# Patient Record
Sex: FEMALE | Race: WHITE | NOT HISPANIC OR LATINO | URBAN - METROPOLITAN AREA
[De-identification: names, ages, dates, MRNs, and addresses within clinical notes are randomized per-mention and may not be internally consistent; named-entity substitution may affect disease eponyms.]

---

## 2018-10-31 ENCOUNTER — TELEMEDICINE2 (OUTPATIENT)
Dept: URGENT CARE | Facility: CLINIC | Age: 33
End: 2018-10-31
Payer: MEDICAID

## 2018-10-31 ENCOUNTER — NON-PROVIDER VISIT (OUTPATIENT)
Dept: MEDICAL GROUP | Facility: CLINIC | Age: 33
End: 2018-10-31
Payer: MEDICAID

## 2018-10-31 ENCOUNTER — TELEMEDICINE ORIGINATING SITE VISIT (OUTPATIENT)
Dept: MEDICAL GROUP | Facility: CLINIC | Age: 33
End: 2018-10-31
Payer: MEDICAID

## 2018-10-31 VITALS
DIASTOLIC BLOOD PRESSURE: 79 MMHG | WEIGHT: 140 LBS | TEMPERATURE: 98.2 F | HEIGHT: 63 IN | RESPIRATION RATE: 16 BRPM | SYSTOLIC BLOOD PRESSURE: 123 MMHG | OXYGEN SATURATION: 98 % | BODY MASS INDEX: 24.8 KG/M2 | HEART RATE: 82 BPM

## 2018-10-31 DIAGNOSIS — N30.01 ACUTE CYSTITIS WITH HEMATURIA: ICD-10-CM

## 2018-10-31 DIAGNOSIS — N23 KIDNEY PAIN: ICD-10-CM

## 2018-10-31 DIAGNOSIS — R39.15 URINARY URGENCY: ICD-10-CM

## 2018-10-31 DIAGNOSIS — R35.0 URINARY FREQUENCY: ICD-10-CM

## 2018-10-31 LAB
APPEARANCE UR: CLEAR
BILIRUB UR STRIP-MCNC: NEGATIVE MG/DL
COLOR UR AUTO: YELLOW
GLUCOSE UR STRIP.AUTO-MCNC: NEGATIVE MG/DL
KETONES UR STRIP.AUTO-MCNC: NEGATIVE MG/DL
LEUKOCYTE ESTERASE UR QL STRIP.AUTO: NORMAL
NITRITE UR QL STRIP.AUTO: POSITIVE
PH UR STRIP.AUTO: 6.5 [PH] (ref 5–8)
PROT UR QL STRIP: NORMAL MG/DL
RBC UR QL AUTO: NORMAL
SP GR UR STRIP.AUTO: 1.02
UROBILINOGEN UR STRIP-MCNC: 0.2 MG/DL

## 2018-10-31 PROCEDURE — 81002 URINALYSIS NONAUTO W/O SCOPE: CPT | Performed by: FAMILY MEDICINE

## 2018-10-31 PROCEDURE — 99204 OFFICE O/P NEW MOD 45 MIN: CPT | Performed by: NURSE PRACTITIONER

## 2018-10-31 RX ORDER — NITROFURANTOIN 25; 75 MG/1; MG/1
100 CAPSULE ORAL EVERY 12 HOURS
Qty: 14 CAP | Refills: 0 | Status: SHIPPED | OUTPATIENT
Start: 2018-10-31 | End: 2018-11-07

## 2018-10-31 ASSESSMENT — ENCOUNTER SYMPTOMS
PALPITATIONS: 0
WEAKNESS: 0
FLANK PAIN: 1
FEVER: 0
HEADACHES: 0
ABDOMINAL PAIN: 0
BACK PAIN: 1
SHORTNESS OF BREATH: 0
CONSTIPATION: 0
DIARRHEA: 0
ORTHOPNEA: 0
NAUSEA: 0
VOMITING: 0
MYALGIAS: 1
DIZZINESS: 0
CHILLS: 0

## 2018-10-31 NOTE — PROGRESS NOTES
"Subjective:      Meeta Chun is a 32 y.o. female who presents with Other (Kidney Pain)            HPI  Meeta is here for acute right sided back pain, states urgency/frequency x 2 days. Denies fever, n/v, abdominal/pelvic pain, no recent vaginal d/c or diarrhea. Denies hematuria or kidney stones. States living in a motel currently. Admits to poor water drinking, generally, but had started to drink more water in last 2 days and has helped pain. Has Advil but has not been taking. Denies pregnancy or recent sexual intercourse. No current birth control. H/o PCOS.    PMH:  has no past medical history on file.  MEDS:   Current Outpatient Prescriptions:   •  nitrofurantoin monohydr macro (MACROBID) 100 MG Cap, Take 1 Cap by mouth every 12 hours for 7 days., Disp: 14 Cap, Rfl: 0  ALLERGIES: No Known Allergies  SURGHX: History reviewed. No pertinent surgical history.  SOCHX:  reports that she has been smoking Cigarettes.  She has a 9.00 pack-year smoking history. She has never used smokeless tobacco. She reports that she does not drink alcohol or use drugs.  FH: Family history was reviewed, no pertinent findings to report    Review of Systems   Constitutional: Negative for chills, fever and malaise/fatigue.   Respiratory: Negative for shortness of breath.    Cardiovascular: Negative for chest pain, palpitations and orthopnea.   Gastrointestinal: Negative for abdominal pain, constipation, diarrhea, nausea and vomiting.   Genitourinary: Positive for flank pain, frequency and urgency. Negative for dysuria and hematuria.   Musculoskeletal: Positive for back pain and myalgias.   Neurological: Negative for dizziness, weakness and headaches.   All other systems reviewed and are negative.         Objective:     /79 (BP Location: Right arm, Patient Position: Sitting)   Pulse 82   Temp 36.8 °C (98.2 °F)   Resp 16   Ht 1.6 m (5' 3\")   Wt 63.5 kg (140 lb)   SpO2 98%   BMI 24.80 kg/m²      Physical Exam   Constitutional: " She is oriented to person, place, and time. Vital signs are normal. She appears well-developed and well-nourished. She is active and cooperative.  Non-toxic appearance. She does not have a sickly appearance. She does not appear ill. No distress.   HENT:   Head: Normocephalic.   Eyes: Pupils are equal, round, and reactive to light. Conjunctivae and EOM are normal.   Neck: Normal range of motion. Neck supple.   Cardiovascular: Normal rate.    Pulmonary/Chest: Effort normal.   Abdominal: Soft. She exhibits no distension. There is no tenderness. There is no rebound and no guarding.   Musculoskeletal: Normal range of motion.   Neurological: She is alert and oriented to person, place, and time.   Skin: Skin is warm and dry. She is not diaphoretic.   Vitals reviewed.    POC Color Yellow   Final   Comment:   Internal Qc Passed Lot#[id number] Exp. 4/30/19   POC Appearance Clear  Negative Final   POC Leukocyte Esterase Trace  Negative Final   POC Nitrites Positive  Negative Final   POC Urobiligen 0.2  Negative (0.2) mg/dL Final   POC Protein Trace  Negative mg/dL Final   POC Urine PH 6.5  5.0 - 8.0 Final   POC Blood Trace-intact  Negative Final   POC Specific Gravity 1.025  <1.005 - >1.030 Final   POC Ketones Negative  Negative mg/dL Final   POC Bilirubin Negative  Negative mg/dL Final   POC Glucose Negative  Negative mg/dL Final               Assessment/Plan:     1. Acute cystitis with hematuria    - nitrofurantoin monohydr macro (MACROBID) 100 MG Cap; Take 1 Cap by mouth every 12 hours for 7 days.  Dispense: 14 Cap; Refill: 0    2. Urinary frequency    - POCT Urinalysis    3. Urinary urgency    - POCT Urinalysis    Increase water intake  Urinate more frequently and empty bladder completely  Practice good toileting hygiene after bowel movements and sexual intercourse, refrain from sexual intercourse until infection cleared  Monitor for increased back/flank pain, difficulty urinating, blood in urine, fever, n/v, abdominal pain-  need re-evaluation, may need to go to ER, patient understands this  Follow up with recheck UA after abx complete in 7-10 days, patient understands this

## 2018-10-31 NOTE — NON-PROVIDER
Meeta Chun is a 32 y.o. female here for a non-provider visit for UA    If abnormal was an in office provider notified today (if so, indicate provider)? Yes  Routed to PCP? Yes